# Patient Record
Sex: MALE | Race: WHITE | URBAN - METROPOLITAN AREA
[De-identification: names, ages, dates, MRNs, and addresses within clinical notes are randomized per-mention and may not be internally consistent; named-entity substitution may affect disease eponyms.]

---

## 2017-07-31 ENCOUNTER — APPOINTMENT (OUTPATIENT)
Dept: URBAN - METROPOLITAN AREA CLINIC 211 | Age: 27
Setting detail: DERMATOLOGY
End: 2017-08-01

## 2017-07-31 DIAGNOSIS — L71.8 OTHER ROSACEA: ICD-10-CM

## 2017-07-31 PROCEDURE — OTHER PRESCRIPTION: OTHER

## 2017-07-31 PROCEDURE — OTHER MIPS QUALITY: OTHER

## 2017-07-31 PROCEDURE — 99202 OFFICE O/P NEW SF 15 MIN: CPT

## 2017-07-31 PROCEDURE — OTHER COUNSELING: OTHER

## 2017-07-31 PROCEDURE — OTHER TREATMENT REGIMEN: OTHER

## 2017-07-31 RX ORDER — SULFACETAMIDE SODIUM AND SULFUR 90; 45 MG/G; MG/G
CREAM TOPICAL
Qty: 1 | Refills: 6 | Status: ERX

## 2017-07-31 RX ORDER — PERMETHRIN 50 MG/100G
CREAM TOPICAL
Qty: 1 | Refills: 1 | Status: ERX | COMMUNITY
Start: 2017-07-31

## 2017-07-31 RX ORDER — SULFACETAMIDE SODIUM AND SULFUR 90; 45 MG/G; MG/G
CREAM TOPICAL
Qty: 1 | Refills: 6 | Status: ERX | COMMUNITY
Start: 2017-07-31

## 2017-07-31 ASSESSMENT — LOCATION DETAILED DESCRIPTION DERM
LOCATION DETAILED: LEFT INFERIOR CENTRAL MALAR CHEEK
LOCATION DETAILED: RIGHT INFERIOR CENTRAL MALAR CHEEK

## 2017-07-31 ASSESSMENT — LOCATION SIMPLE DESCRIPTION DERM
LOCATION SIMPLE: RIGHT CHEEK
LOCATION SIMPLE: LEFT CHEEK

## 2017-07-31 ASSESSMENT — LOCATION ZONE DERM: LOCATION ZONE: FACE

## 2017-07-31 NOTE — PROCEDURE: MIPS QUALITY
Quality 431: Preventive Care And Screening: Unhealthy Alcohol Use - Screening: Patient screened for unhealthy alcohol use using a single question and scores less than 2 times per year
Detail Level: Detailed
Quality 400a: One-Time Screening For Hepatitis C Virus (Hcv) For All Patients: Documentation of patient reason(s) for not receiving one-time screening for HCV infection
Quality 130: Documentation Of Current Medications In The Medical Record: Current Medications Documented
Quality 226: Preventive Care And Screening: Tobacco Use: Screening And Cessation Intervention: Patient screened for tobacco and never smoked
Quality 131: Pain Assessment And Follow-Up: Pain assessment documented as positive using a standardized tool AND a follow-up plan is documented
Quality 110: Preventive Care And Screening: Influenza Immunization: Influenza immunization was not ordered or administered, reason not given

## 2017-08-28 ENCOUNTER — APPOINTMENT (OUTPATIENT)
Dept: URBAN - METROPOLITAN AREA CLINIC 211 | Age: 27
Setting detail: DERMATOLOGY
End: 2017-08-30

## 2017-08-28 DIAGNOSIS — L71.8 OTHER ROSACEA: ICD-10-CM

## 2017-08-28 PROCEDURE — OTHER MIPS QUALITY: OTHER

## 2017-08-28 PROCEDURE — 99213 OFFICE O/P EST LOW 20 MIN: CPT

## 2017-08-28 PROCEDURE — OTHER PRESCRIPTION: OTHER

## 2017-08-28 PROCEDURE — OTHER TREATMENT REGIMEN: OTHER

## 2017-08-28 PROCEDURE — OTHER COUNSELING: OTHER

## 2017-08-28 RX ORDER — IVERMECTIN 10 MG/G
CREAM TOPICAL
Qty: 1 | Refills: 11 | Status: ERX | COMMUNITY
Start: 2017-08-28

## 2017-08-28 ASSESSMENT — LOCATION DETAILED DESCRIPTION DERM: LOCATION DETAILED: LEFT CENTRAL MALAR CHEEK

## 2017-08-28 ASSESSMENT — LOCATION SIMPLE DESCRIPTION DERM: LOCATION SIMPLE: LEFT CHEEK

## 2017-08-28 ASSESSMENT — LOCATION ZONE DERM: LOCATION ZONE: FACE

## 2017-08-28 NOTE — PROCEDURE: TREATMENT REGIMEN
Initiate Treatment: Soolantra FF QD, samples also provided
Detail Level: Zone
Plan: May consider oral abx if no imp in 2 weeks
Discontinue Regimen: Elimite
Continue Regimen: Sumadan wash 1-2 x day

## 2017-08-28 NOTE — PROCEDURE: MIPS QUALITY
Quality 400a: One-Time Screening For Hepatitis C Virus (Hcv) For All Patients: Documentation of patient reason(s) for not receiving one-time screening for HCV infection
Quality 130: Documentation Of Current Medications In The Medical Record: Current Medications Documented
Quality 131: Pain Assessment And Follow-Up: Pain assessment documented as positive using a standardized tool AND a follow-up plan is documented
Quality 226: Preventive Care And Screening: Tobacco Use: Screening And Cessation Intervention: Patient screened for tobacco and never smoked
Detail Level: Detailed
Quality 110: Preventive Care And Screening: Influenza Immunization: Influenza immunization was not ordered or administered, reason not given
Quality 431: Preventive Care And Screening: Unhealthy Alcohol Use - Screening: Patient screened for unhealthy alcohol use using a single question and scores less than 2 times per year

## 2017-09-14 ENCOUNTER — APPOINTMENT (OUTPATIENT)
Dept: URBAN - METROPOLITAN AREA CLINIC 211 | Age: 27
Setting detail: DERMATOLOGY
End: 2017-09-14

## 2017-09-14 DIAGNOSIS — L71.8 OTHER ROSACEA: ICD-10-CM

## 2017-09-14 PROCEDURE — OTHER MIPS QUALITY: OTHER

## 2017-09-14 PROCEDURE — 99213 OFFICE O/P EST LOW 20 MIN: CPT

## 2017-09-14 PROCEDURE — OTHER COUNSELING: OTHER

## 2017-09-14 PROCEDURE — OTHER PRESCRIPTION: OTHER

## 2017-09-14 PROCEDURE — OTHER TREATMENT REGIMEN: OTHER

## 2017-09-14 RX ORDER — DOXYCYCLINE 40 MG/1
CAPSULE ORAL QD
Qty: 30 | Refills: 11 | Status: ERX | COMMUNITY
Start: 2017-09-14

## 2017-09-14 ASSESSMENT — LOCATION DETAILED DESCRIPTION DERM: LOCATION DETAILED: LEFT CENTRAL MALAR CHEEK

## 2017-09-14 ASSESSMENT — LOCATION ZONE DERM: LOCATION ZONE: FACE

## 2017-09-14 ASSESSMENT — LOCATION SIMPLE DESCRIPTION DERM: LOCATION SIMPLE: LEFT CHEEK

## 2017-09-14 NOTE — PROCEDURE: TREATMENT REGIMEN
Plan: If no imp in one month after starting doxy, may consider oral ivermectin/metronidazole
Continue Regimen: Sumadan wash BID, Soolantra FF QD
Samples Given: Oracea
Initiate Treatment: Oracea 40mg QD (if not covered will switch to doxy 20mg taking 2 pills daily)
Detail Level: Zone

## 2017-09-14 NOTE — PROCEDURE: MIPS QUALITY
Quality 226: Preventive Care And Screening: Tobacco Use: Screening And Cessation Intervention: Patient screened for tobacco and never smoked
Detail Level: Detailed
Quality 110: Preventive Care And Screening: Influenza Immunization: Influenza immunization was not ordered or administered, reason not given
Quality 130: Documentation Of Current Medications In The Medical Record: Current Medications Documented
Quality 400a: One-Time Screening For Hepatitis C Virus (Hcv) For All Patients: Documentation of patient reason(s) for not receiving one-time screening for HCV infection
Quality 131: Pain Assessment And Follow-Up: Pain assessment documented as positive using a standardized tool AND a follow-up plan is documented
Quality 431: Preventive Care And Screening: Unhealthy Alcohol Use - Screening: Patient screened for unhealthy alcohol use using a single question and scores less than 2 times per year
